# Patient Record
Sex: MALE | Race: WHITE | NOT HISPANIC OR LATINO | Employment: OTHER | URBAN - METROPOLITAN AREA
[De-identification: names, ages, dates, MRNs, and addresses within clinical notes are randomized per-mention and may not be internally consistent; named-entity substitution may affect disease eponyms.]

---

## 2019-02-18 ENCOUNTER — TELEPHONE (OUTPATIENT)
Dept: GASTROENTEROLOGY | Facility: CLINIC | Age: 74
End: 2019-02-18

## 2019-02-28 ENCOUNTER — TELEPHONE (OUTPATIENT)
Dept: GASTROENTEROLOGY | Facility: CLINIC | Age: 74
End: 2019-02-28

## 2019-03-05 ENCOUNTER — OFFICE VISIT (OUTPATIENT)
Dept: GASTROENTEROLOGY | Facility: CLINIC | Age: 74
End: 2019-03-05
Payer: COMMERCIAL

## 2019-03-05 VITALS
SYSTOLIC BLOOD PRESSURE: 130 MMHG | BODY MASS INDEX: 25.46 KG/M2 | HEART RATE: 81 BPM | WEIGHT: 168 LBS | DIASTOLIC BLOOD PRESSURE: 68 MMHG | HEIGHT: 68 IN | RESPIRATION RATE: 16 BRPM

## 2019-03-05 DIAGNOSIS — R19.4 CHANGE IN BOWEL HABITS: ICD-10-CM

## 2019-03-05 DIAGNOSIS — Z86.010 HISTORY OF COLON POLYPS: ICD-10-CM

## 2019-03-05 DIAGNOSIS — K59.09 OTHER CONSTIPATION: ICD-10-CM

## 2019-03-05 DIAGNOSIS — R11.0 NAUSEA: ICD-10-CM

## 2019-03-05 DIAGNOSIS — K21.9 GASTROESOPHAGEAL REFLUX DISEASE, ESOPHAGITIS PRESENCE NOT SPECIFIED: ICD-10-CM

## 2019-03-05 DIAGNOSIS — R63.4 WEIGHT LOSS: ICD-10-CM

## 2019-03-05 DIAGNOSIS — R10.13 EPIGASTRIC PAIN: Primary | ICD-10-CM

## 2019-03-05 PROBLEM — Z86.0100 HISTORY OF COLON POLYPS: Status: ACTIVE | Noted: 2019-03-05

## 2019-03-05 PROCEDURE — 99204 OFFICE O/P NEW MOD 45 MIN: CPT | Performed by: INTERNAL MEDICINE

## 2019-03-05 RX ORDER — ZALEPLON 10 MG/1
10 CAPSULE ORAL DAILY PRN
Refills: 5 | COMMUNITY
Start: 2019-02-24

## 2019-03-05 RX ORDER — IMATINIB MESYLATE 400 MG/1
TABLET, FILM COATED ORAL
Status: ON HOLD | COMMUNITY
Start: 2018-12-23 | End: 2019-03-26

## 2019-03-05 RX ORDER — IMATINIB MESYLATE 400 MG/1
TABLET, FILM COATED ORAL
COMMUNITY

## 2019-03-05 RX ORDER — GABAPENTIN 600 MG/1
TABLET ORAL
COMMUNITY

## 2019-03-05 RX ORDER — ALPRAZOLAM 2 MG/1
TABLET ORAL
COMMUNITY

## 2019-03-05 RX ORDER — OXYCODONE HYDROCHLORIDE 15 MG/1
TABLET ORAL
COMMUNITY

## 2019-03-05 RX ORDER — HYDROCHLOROTHIAZIDE 50 MG/1
50 TABLET ORAL DAILY
Refills: 1 | COMMUNITY
Start: 2019-01-07

## 2019-03-05 RX ORDER — OXYCODONE HCL 10 MG/1
TABLET, FILM COATED, EXTENDED RELEASE ORAL EVERY 12 HOURS
COMMUNITY

## 2019-03-05 RX ORDER — QUETIAPINE FUMARATE 100 MG/1
100 TABLET, FILM COATED ORAL
COMMUNITY

## 2019-03-05 RX ORDER — BUPROPION HYDROCHLORIDE 150 MG/1
TABLET ORAL
Refills: 0 | Status: ON HOLD | COMMUNITY
Start: 2019-02-07 | End: 2019-03-26

## 2019-03-05 RX ORDER — ALPRAZOLAM 2 MG/1
2 TABLET, EXTENDED RELEASE ORAL 3 TIMES DAILY
Refills: 5 | COMMUNITY
Start: 2019-02-13

## 2019-03-05 RX ORDER — ONDANSETRON HYDROCHLORIDE 8 MG/1
TABLET, FILM COATED ORAL
Refills: 1 | COMMUNITY
Start: 2018-12-15

## 2019-03-05 NOTE — PROGRESS NOTES
[facility]      Chief Complaint:   Abdominal pain    HPI:  Alessandro Ruiz is a self-referred 68 y o   male who presents with  Abdominal pain  He comes at the request of his oncologist who asked that he find a gastroenterologist   According to the patient he has a history of CML originally diagnosed in 2008  For some time he has been having daily abdominal pain  This is mostly midepigastric and associated with nausea  This started when he began treatment with Gleevec  He takes Zofran for the nausea  He notes that both Tums and gas ex help with the abdominal discomfort  He also drinks aloe which helps  Yogurt also helps  He has had 10 lb weight loss over the last 6-8 months  His last MRI of the abdomen according to the patient was 2 years ago  His oncologist was apparently satisfied with the results  Ten years ago the patient underwent both EGD and colonoscopy  He was found to have polyps but told he only needed 10 year follow-up  He does have a change in bowel habits with some increasing constipation  He denies any rectal bleeding or hematochezia  The patient sees oncologist about every 3 months  He has no vomiting  No dysphagia  No chest pain  He does have some nocturnal symptomatology with reflux  He actually brings substance back up into the back of his throat  There is no actual vomiting  Again food and the other above-mentioned items make this better  He does take aspirin and occasional ibuprofen for the abdominal pain  He has not had any recent studies of any kind  He has significant anxiety about these things because of his diagnosis of leukemia  He has been found to have prostate cancer but this is apparently low grade and just being observed  He has no other GI symptomatology  No other definitive exacerbating or remitting factors        Review of Systems:   Constitutional: No fever or chills, feels well, no tiredness, no recent weight gain or weight loss  HENT: No complaints of earache, no hearing loss, no nosebleeds, no nasal discharge, no sore throat, no hoarseness  Eyes: No complaints of eye pain, no red eyes, no discharge from eyes, no itchy eyes  Cardiovascular: No complaints of slow heart rate, no fast heart rate, no chest pain, no palpitations, no leg claudication, no lower extremity edema  Respiratory: No complaints of shortness of breath, no wheezing, no cough, no SOB on exertion, no orthopnea  Gastrointestinal: As noted in HPI  Genitourinary: No complaints of dysuria, no incontinence, no hesitancy, no nocturia  Musculoskeletal:   Positive arthralgias and myalgias  Neurological: No complaints of headache, no confusion, no convulsions, no numbness or tingling, no dizziness or fainting, no limb weakness, no difficulty walking  Skin: No complaints of skin rash or skin lesions, no itching, no skin wound, no dry skin  Hematological/Lymphatic: No complaints of swollen glands, does not bleed easy  Allergic/Immunologic: No immunocompromised state  Endocrine:  No complaints of polyuria, no polydipsia     Psychiatric/Behavioral: is not suicidal, no sleep disturbances,  Positive anxiety       Historical Information   Past Medical History:   Diagnosis Date    Anxiety     [facility]ML [facility]chronic [facility]ocytic leukemia) ([facility] )     Hypertension     Prostate cancer ([facility] )      Past Surgical History:   Procedure Laterality Date    TONSILLECTOMY       Social History   Social History     Substance and Sexual Activity   Alcohol Use Never    Frequency: Never     Social History     Substance and Sexual Activity   Drug Use Never     Social History     Tobacco Use   Smoking Status Current Some Day Smoker   Smokeless Tobacco Current User   Tobacco Comment    VAPE     Family History   Problem Relation Age of Onset    Pneumonia Mother     Ulcerative colitis Father          Current Medications: has a current medication list which includes the following prescription(s): alprazolam, alprazolam er, bupropion, gabapentin, hydrochlorothiazide, imatinib, imatinib, ondansetron, oxycodone, oxycodone, quetiapine, and zaleplon  Vital Signs: /68   Pulse 81   Resp 16   Ht 5' 8" (1 727 m)   Wt 76 2 kg (168 lb)   BMI 25 54 kg/m²       Physical Exam:   Constitutional  General Appearance: No acute distress, well appearing and well nourished  Head  Normocephalic  Eyes  Conjunctivae and lids: No swelling, erythema, or discharge  Pupils and irises: Equal, round and reactive to light  Ears, Nose, Mouth, and Throat  External inspection of ears and nose: Normal  Nasal mucosa, septum and turbinates: Normal without edema or erythema/   Oropharynx: Normal with no erythema, edema, exudate or lesions  Neck  Normal range of motion  Neck supple  Cardiovascular  Auscultation of the heart: Normal rate and rhythm, normal S1 and S2 without murmurs  Examination of the extremities for edema and/or varicosities: Normal  Pulmonary/Chest  Respiratory effort: No increased work of breathing or signs of respiratory distress  Auscultation of lungs: Clear to auscultation, equal breath sounds bilaterally, no wheezes, rales, no rhonchi  Abdomen  Abdomen: Non-tender, no masses  Liver and spleen: No hepatomegaly or splenomegaly  Musculoskeletal  Gait and station: normal   Digits and Nails: normal without clubbing or cyanosis  Inspection/palpation of joints, bones, and muscles: Normal  Neurological  No nystagmus or asterixis  Skin  Skin and subcutaneous tissue: Normal without rashes or lesions  Lymphatic  Palpation of the lymph nodes in neck: No lymphadenopathy     Psychiatric  Orientation to person, place and time: Normal   Mood and affect: Normal          Labs:  No results found for: ALT, AST, BUN, CALCIUM, CL, CHOL, CO2, CREATININE, GFRAA, GFRNONAA, HDL, HCT, HGB, HGBA1C, LDL, MG, PHOS, PLT, K, PSA, NA, TRIG, WBC      X-Rays & Procedures:   No orders to display ______________________________________________________________________      Assessment & Plan:     Litzy Amador was seen today for abdominal pain and constipation  Diagnoses and all orders for this visit:    Epigastric pain    Nausea    Gastroesophageal reflux disease, esophagitis presence not specified    Weight loss    Change in bowel habits    Other constipation    History of colon polyps     patient will be scheduled for both EGD and colonoscopy  Last colonoscopy and EGD were 10 years ago when polyps were found  He will also undergo ultrasound of the abdomen  He is advised to begin Ensure 2-3 cans a day  He will begin Pepcid 20 mg p  O  B i d  Further recommendations will depend on the study results

## 2019-03-17 ENCOUNTER — HOSPITAL ENCOUNTER (OUTPATIENT)
Dept: ULTRASOUND IMAGING | Facility: HOSPITAL | Age: 74
Discharge: HOME | End: 2019-03-17
Attending: INTERNAL MEDICINE
Payer: COMMERCIAL

## 2019-03-17 DIAGNOSIS — R10.13 EPIGASTRIC PAIN: ICD-10-CM

## 2019-03-17 DIAGNOSIS — R63.4 WEIGHT LOSS: ICD-10-CM

## 2019-03-17 PROCEDURE — 76700 US EXAM ABDOM COMPLETE: CPT

## 2019-03-18 ENCOUNTER — TELEPHONE (OUTPATIENT)
Dept: GASTROENTEROLOGY | Facility: CLINIC | Age: 74
End: 2019-03-18

## 2019-03-25 ENCOUNTER — ANESTHESIA EVENT (OUTPATIENT)
Dept: PERIOP | Facility: HOSPITAL | Age: 74
End: 2019-03-25
Payer: COMMERCIAL

## 2019-03-26 ENCOUNTER — ANESTHESIA (OUTPATIENT)
Dept: PERIOP | Facility: HOSPITAL | Age: 74
End: 2019-03-26
Payer: COMMERCIAL

## 2019-03-26 ENCOUNTER — HOSPITAL ENCOUNTER (OUTPATIENT)
Facility: HOSPITAL | Age: 74
Setting detail: OUTPATIENT SURGERY
Discharge: HOME | End: 2019-03-26
Attending: INTERNAL MEDICINE | Admitting: INTERNAL MEDICINE
Payer: COMMERCIAL

## 2019-03-26 VITALS
BODY MASS INDEX: 25.26 KG/M2 | HEART RATE: 72 BPM | WEIGHT: 166.67 LBS | OXYGEN SATURATION: 98 % | SYSTOLIC BLOOD PRESSURE: 117 MMHG | TEMPERATURE: 97.4 F | HEIGHT: 68 IN | RESPIRATION RATE: 19 BRPM | DIASTOLIC BLOOD PRESSURE: 64 MMHG

## 2019-03-26 DIAGNOSIS — R63.4 WEIGHT LOSS: ICD-10-CM

## 2019-03-26 DIAGNOSIS — K21.9 GASTROESOPHAGEAL REFLUX DISEASE WITHOUT ESOPHAGITIS: ICD-10-CM

## 2019-03-26 DIAGNOSIS — R10.13 MIDEPIGASTRIC PAIN: ICD-10-CM

## 2019-03-26 DIAGNOSIS — Z86.010 HISTORY OF COLON POLYPS: ICD-10-CM

## 2019-03-26 DIAGNOSIS — R19.4 CHANGE IN BOWEL HABITS: ICD-10-CM

## 2019-03-26 DIAGNOSIS — R11.0 NAUSEA: ICD-10-CM

## 2019-03-26 PROCEDURE — 88305 TISSUE EXAM BY PATHOLOGIST: CPT | Performed by: PATHOLOGY

## 2019-03-26 PROCEDURE — 43239 EGD BIOPSY SINGLE/MULTIPLE: CPT | Performed by: INTERNAL MEDICINE

## 2019-03-26 RX ORDER — SODIUM CHLORIDE, SODIUM LACTATE, POTASSIUM CHLORIDE, CALCIUM CHLORIDE 600; 310; 30; 20 MG/100ML; MG/100ML; MG/100ML; MG/100ML
125 INJECTION, SOLUTION INTRAVENOUS CONTINUOUS
Status: DISCONTINUED | OUTPATIENT
Start: 2019-03-26 | End: 2019-03-26 | Stop reason: HOSPADM

## 2019-03-26 RX ORDER — PROPOFOL 10 MG/ML
INJECTION, EMULSION INTRAVENOUS AS NEEDED
Status: DISCONTINUED | OUTPATIENT
Start: 2019-03-26 | End: 2019-03-26 | Stop reason: SURG

## 2019-03-26 RX ADMIN — PROPOFOL 100 MG: 10 INJECTION, EMULSION INTRAVENOUS at 11:42

## 2019-03-26 RX ADMIN — PROPOFOL 20 MG: 10 INJECTION, EMULSION INTRAVENOUS at 11:47

## 2019-03-26 RX ADMIN — PROPOFOL 30 MG: 10 INJECTION, EMULSION INTRAVENOUS at 11:45

## 2019-03-26 RX ADMIN — SODIUM CHLORIDE, SODIUM LACTATE, POTASSIUM CHLORIDE, AND CALCIUM CHLORIDE: .6; .31; .03; .02 INJECTION, SOLUTION INTRAVENOUS at 11:15

## 2019-03-26 NOTE — ANESTHESIA POSTPROCEDURE EVALUATION
Post-Op Assessment Note    CV Status:  Stable  Pain Score: 0    Pain management: adequate     Mental Status:  Alert and awake   Hydration Status:  Stable   PONV Controlled:  None   Airway Patency:  Adequate   Post Op Vitals Reviewed: Yes      Staff: Anesthesiologist, CRNA           BP   110/56   Temp      Pulse  64   Resp   18   SpO2   99%

## 2019-03-26 NOTE — ANESTHESIA PREPROCEDURE EVALUATION
Review of Systems/Medical History  Patient summary reviewed  Chart reviewed      Cardiovascular  EKG reviewed, Exercise tolerance (METS): >4,  Hypertension controlled,    Pulmonary  Negative pulmonary ROS        GI/Hepatic    GERD well controlled,        Negative  ROS        Endo/Other  Negative endo/other ROS      GYN       Hematology  Negative hematology ROS      Musculoskeletal  Negative musculoskeletal ROS        Neurology  Negative neurology ROS      Psychology   Anxiety,              Physical Exam    Airway    Mallampati score: II  TM Distance: <3 FB  Neck ROM: full     Dental   No notable dental hx     Cardiovascular  Rhythm: regular, Rate: normal, Cardiovascular exam normal    Pulmonary  Pulmonary exam normal Breath sounds clear to auscultation,     Other Findings        Anesthesia Plan  ASA Score- 2     Anesthesia Type- IV sedation with anesthesia with ASA Monitors  Additional Monitors:   Airway Plan:         Plan Factors-    Induction- intravenous  Postoperative Plan-     Informed Consent- Anesthetic plan and risks discussed with patient  I personally reviewed this patient with the CRNA  Discussed and agreed on the Anesthesia Plan with the CRNA  Malena Alonzo

## 2019-04-02 ENCOUNTER — TELEPHONE (OUTPATIENT)
Dept: GASTROENTEROLOGY | Facility: CLINIC | Age: 74
End: 2019-04-02

## 2019-05-06 ENCOUNTER — TELEPHONE (OUTPATIENT)
Dept: GASTROENTEROLOGY | Facility: CLINIC | Age: 74
End: 2019-05-06

## 2019-05-31 ENCOUNTER — TELEPHONE (OUTPATIENT)
Dept: GASTROENTEROLOGY | Facility: CLINIC | Age: 74
End: 2019-05-31

## 2019-07-09 ENCOUNTER — TELEPHONE (OUTPATIENT)
Dept: GASTROENTEROLOGY | Facility: CLINIC | Age: 74
End: 2019-07-09

## 2019-07-09 NOTE — TELEPHONE ENCOUNTER
ptn states he has a stomach ache everyday for at least a half a day even with taking the medication  ptn recently had a procedure with Dr Carrie Morris   Please advise

## 2019-07-09 NOTE — TELEPHONE ENCOUNTER
Patient will continue to monitor symptoms and will go to ER if symptoms increase/severe  Patient is agreeable to office visit to discuss medication regimen and symptoms  Clerical -Please contact patient to schedule follow up visit

## 2019-07-09 NOTE — TELEPHONE ENCOUNTER
Patient call for persistent abdominal pain  Patient states abdominal pain is present when he wakes up and improves by noon after taking his medications  Pain level usually 5/10, but up to 10/10 and lasts 1/2 hour to 45 mins with occasional nausea  Bowels are regular -takes stool softener as needed  Denies fever/chills  Following GERD precautions  Taking Pepcid 20 mg in AM and 40 mg in PM and Tums/Gas X as needed  Alarm symptoms discussed with patient and he will go to ER for evaluation if symptoms become severe

## 2021-04-13 DIAGNOSIS — Z23 ENCOUNTER FOR IMMUNIZATION: ICD-10-CM

## 2022-05-19 NOTE — TELEPHONE ENCOUNTER
1:30 Tuesday or thursday
Can you call pt make appt plz 
Spoke to ptn wife, they will pick Tuesday or Thursday and call us back to let us know which day to schedule 
wine

## 2024-11-06 ENCOUNTER — CONSULT (OUTPATIENT)
Age: 79
End: 2024-11-06
Payer: COMMERCIAL

## 2024-11-06 VITALS
HEART RATE: 67 BPM | WEIGHT: 172 LBS | BODY MASS INDEX: 26.07 KG/M2 | HEIGHT: 68 IN | DIASTOLIC BLOOD PRESSURE: 70 MMHG | SYSTOLIC BLOOD PRESSURE: 108 MMHG | OXYGEN SATURATION: 98 %

## 2024-11-06 DIAGNOSIS — R11.0 NAUSEA: ICD-10-CM

## 2024-11-06 DIAGNOSIS — R10.13 EPIGASTRIC PAIN: Primary | ICD-10-CM

## 2024-11-06 DIAGNOSIS — K59.00 CONSTIPATION, UNSPECIFIED CONSTIPATION TYPE: ICD-10-CM

## 2024-11-06 DIAGNOSIS — K62.5 RECTAL BLEEDING: ICD-10-CM

## 2024-11-06 PROCEDURE — 99204 OFFICE O/P NEW MOD 45 MIN: CPT | Performed by: PHYSICIAN ASSISTANT

## 2024-11-06 RX ORDER — HYDROXYZINE HYDROCHLORIDE 50 MG/1
1 TABLET, FILM COATED ORAL 2 TIMES DAILY
COMMUNITY

## 2024-11-06 RX ORDER — DULOXETIN HYDROCHLORIDE 60 MG/1
2 CAPSULE, DELAYED RELEASE ORAL DAILY
COMMUNITY
Start: 2024-10-23

## 2024-11-06 NOTE — PATIENT INSTRUCTIONS
Scheduled date of EGD/colonoscopy (as of today): 11/19/24  Physician performing EGD/colonoscopy: Caio  Location of EGD/colonoscopy: [facility]  Desired bowel prep reviewed with patient: Miralax  Instructions reviewed with patient by: Sharita VELA  Clearances:

## 2024-11-06 NOTE — H&P (VIEW-ONLY)
[facility] - Outpatient Consultation  Sreekanth Rojas 79 y.o. male MRN: [id number]  Encounter: [id number]          ASSESSMENT AND PLAN:      1. Epigastric pain  2. Nausea  3. Rectal bleeding  4. Constipation    Patient presents with complaints of worsening epigastric discomfort over the past month with associated nausea.  He also reports constipation and intermittent rectal bleeding/blood in the stool.  He has a hx of a 3cm HH on EGD in 2019.  He reports his last colonoscopy was over 15 years ago.    Will plan for EGD and colonoscopy to investigate.  Abdominal ultrasound to investigate.  Will begin a course of Pepcid 20mg po BID.  Will begin Miralax 1 capful daily to regulate  his bowel movements.    ______________________________________________________________________    HPI:  Patient is a pleasant 79 year old male with a PMH of CML, anxiety, HTN, prostate cancer who presents to the office for a gastrointestinal evaluation. Patient presents with complaints of worsening epigastric discomfort over the past month with associated nausea. He also reports constipation and intermittent rectal bleeding/blood in the stool. He has a history of a 3cm hiatal hernia on EGD in 2019.  He reports his last colonoscopy was over 15 years ago.  He thinks he has a history of colon polyps.  No family history of esophageal, stomach, colon, or pancreatic cancer.    I discussed informed consent with the patient for the EGD/colonoscopy. The risks/benefits of the procedure were discussed with the patient. Risks included, but not limited to, infection, bleeding, perforation were discussed. Patient was agreeable.       REVIEW OF SYSTEMS:    CONSTITUTIONAL: Denies any fever, chills, rigors, and weight loss.  HEENT: No earache or tinnitus. Denies hearing loss or visual disturbances.  CARDIOVASCULAR: No chest pain or palpitations.   RESPIRATORY: Denies any cough, hemoptysis, shortness of breath or dyspnea on  exertion.  GASTROINTESTINAL: As noted in the History of Present Illness.   GENITOURINARY: No problems with urination. Denies any hematuria or dysuria.  NEUROLOGIC: No dizziness or vertigo, denies headaches.   MUSCULOSKELETAL: Denies any muscle or joint pain.   SKIN: Denies skin rashes or itching.   ENDOCRINE: Denies excessive thirst. Denies intolerance to heat or cold.  PSYCHOSOCIAL: Denies depression or anxiety. Denies any recent memory loss.       Historical Information   Past Medical History:   Diagnosis Date    Anxiety     CML (chronic myelocytic leukemia) (HCC)     Hypertension     Prostate cancer (HCC)      Past Surgical History:   Procedure Laterality Date    COLONOSCOPY      ESOPHAGOGASTRODUODENOSCOPY      [place] ESOPHAGOGASTRODUODENOSCOPY TRANSORAL DIAGNOSTIC N/A 3/26/2019    Procedure: ESOPHAGOGASTRODUODENOSCOPY (EGD);  Surgeon: Simón Kearney MD;  Location: [facility];  Service: Gastroenterology    PROSTATE BIOPSY      TONSILLECTOMY       Social History   Social History     Substance and Sexual Activity   Alcohol Use Not Currently    Alcohol/week: 1.0 standard drink of alcohol    Types: 1 Glasses of wine per week     Social History     Substance and Sexual Activity   Drug Use Never     Social History     Tobacco Use   Smoking Status Never   Smokeless Tobacco Former   Tobacco Comments    VAPE     Family History   Problem Relation Age of Onset    Pneumonia Mother     Ulcerative colitis Father        Meds/Allergies       Current Outpatient Medications:     ALPRAZolam (XANAX) 2 MG tablet    ALPRAZolam ER (XANAX XR) 2 MG 24 hr tablet    DULoxetine (CYMBALTA) 60 mg delayed release capsule    hydrochlorothiazide (HYDRODIURIL) 50 mg tablet    hydrOXYzine HCL (ATARAX) 50 mg tablet    imatinib (GLEEVEC) 400 mg tablet    ondansetron (ZOFRAN) 8 mg tablet    oxyCODONE (OXYCONTIN) 10 mg 12 hr tablet    oxyCODONE (ROXICODONE) 15 mg immediate release tablet    QUEtiapine (SEROquel) 100 mg tablet    zaleplon (SONATA) 10 MG  "capsule    gabapentin (NEURONTIN) 600 MG tablet    Allergies   Allergen Reactions    Sulfa Antibiotics Swelling           Objective     Blood pressure 108/70, pulse 67, height 5' 8\" (1.727 m), weight 78 kg (172 lb), SpO2 98%. Body mass index is 26.15 kg/m².        PHYSICAL EXAM:      General Appearance:   Alert, cooperative, no distress   HEENT:   Normocephalic, atraumatic, anicteric.     Neck:  Supple, symmetrical, trachea midline   Lungs:   Clear to auscultation bilaterally; no rales, rhonchi or wheezing; respirations unlabored    Heart::   Regular rate and rhythm; no murmur, rub, or gallop.   Abdomen:   Soft, non-tender, non-distended; normal bowel sounds; no masses, no organomegaly    Genitalia:   Deferred    Rectal:   Deferred    Extremities:  No cyanosis, clubbing or edema    Pulses:  2+ and symmetric    Skin:  No jaundice, rashes, or lesions    Lymph nodes:  No palpable cervical lymphadenopathy        Lab Results:   No visits with results within 1 Day(s) from this visit.   Latest known visit with results is:   Admission on 03/26/2019, Discharged on 03/26/2019   Component Date Value    Case Report 03/26/2019                      Value:Surgical Pathology Report                         Case: [id number]                                   Authorizing Provider:  Simón Kearney MD      Collected:           03/26/2019 1144              Ordering Location:     [facility] Received:            03/26/2019 5556                                     Operating Room                                                               Pathologist:           Misa Newell MD                                                               Specimens:   A) - Stomach, antrum                                                                                B) - Stomach, body                                                                                  C) - Stomach, fundus                                                          " "             Final Diagnosis 03/26/2019                      Value:A. Antrum (biopsy):                          - Antral mucosa with no diagnostic abnormality                          - No H pylori identified (H&E)                          - No intestinal metaplasia                                                     B. Stomach, body (biopsy):                          - Mild chronic inactive oxyntic gastritis                          - No H pylori identified (H&E)                          - No intestinal metaplasia                                                     C. Stomach, fundus (biopsy):                          - Oxyntic mucosa with mild chronic inflammation                           - No H pylori identified (H&E)                          - No intestinal metaplasia     Additional Information 03/26/2019                      Value:All controls performed with the immunohistochemical stains reported above                           reacted appropriately.  These tests were developed and their performance                           characteristics determined by [facility] or                           [employer]. They may not be cleared or approved by the [place]                           Food and Drug Administration. The FDA has determined that such clearance                           or approval is not necessary. These tests are used for clinical purposes.                           They should not be regarded as investigational or for research. This                           laboratory has been approved by CLIA 88, designated as a high-complexity                           laboratory and is qualified to perform these tests.    Gross Description 03/26/2019                      Value:A. The specimen is received in formalin, labeled with the patient's name                           and medical record number, and is designated \"stomach antrum biopsy rule                           out H " "pylori”.  The specimen consists of 2 tan-pink soft tissue fragments                           measuring 0.4 and 0.6 cm in greatest dimension.  The specimen is entirely                           submitted in a screened cassette.                                                    B. The specimen is received in formalin, labeled with the patient's name                           and medical record number, and is designated \"stomach body biopsy rule out                           H pylori”.  The specimen consists of a single tan-brown soft tissue                           fragment measuring 0.8 cm in greatest dimension.  The specimen is entirely                           submitted in a screened cassette.                                                    C. The specimen is received in formalin, labeled with the patient's name                           and medical record number, and is designated \"stomach fundus biopsy rule                           out H pylori”.  The specimen consists of 3 tan-pink soft tissue fragments                           ranging from 0.2-0.6 cm in greatest dimension.  The specimen is entirely                           submitted in a screened cassette.                                                    Note: The estimated total formalin fixation time based upon information                           provided by the submitting clinician and the standard processing schedule                           is under 72 hours.                                                                             AGezyk    Clinical Information 03/26/2019                      Value:Cold bx r/o H Pylori    EGD  Esophageal mucosa is normal throughout its length with a normal Z-line at 35 cm. Beyond this there is a 3 cm hiatal hernia with normal gastric mucosa.  The stomach is entered.  The body and antrum normal.  The pylorus is normal.  The duodenum was cannulated into the 3rd portion and is normal.  Retroversion reveals " a normal GE junction fundus and cardia         Radiology Results:   No results found.

## 2024-11-06 NOTE — PROGRESS NOTES
[facility] - Outpatient Consultation  Sreekanth Rojas 79 y.o. male MRN: [id number]  Encounter: [id number]          ASSESSMENT AND PLAN:      1. Epigastric pain  2. Nausea  3. Rectal bleeding  4. Constipation    Patient presents with complaints of worsening epigastric discomfort over the past month with associated nausea.  He also reports constipation and intermittent rectal bleeding/blood in the stool.  He has a hx of a 3cm HH on EGD in 2019.  He reports his last colonoscopy was over 15 years ago.    Will plan for EGD and colonoscopy to investigate.  Abdominal ultrasound to investigate.  Will begin a course of Pepcid 20mg po BID.  Will begin Miralax 1 capful daily to regulate  his bowel movements.    ______________________________________________________________________    HPI:  Patient is a pleasant 79 year old male with a PMH of CML, anxiety, HTN, prostate cancer who presents to the office for a gastrointestinal evaluation. Patient presents with complaints of worsening epigastric discomfort over the past month with associated nausea. He also reports constipation and intermittent rectal bleeding/blood in the stool. He has a history of a 3cm hiatal hernia on EGD in 2019.  He reports his last colonoscopy was over 15 years ago.  He thinks he has a history of colon polyps.  No family history of esophageal, stomach, colon, or pancreatic cancer.    I discussed informed consent with the patient for the EGD/colonoscopy. The risks/benefits of the procedure were discussed with the patient. Risks included, but not limited to, infection, bleeding, perforation were discussed. Patient was agreeable.       REVIEW OF SYSTEMS:    CONSTITUTIONAL: Denies any fever, chills, rigors, and weight loss.  HEENT: No earache or tinnitus. Denies hearing loss or visual disturbances.  CARDIOVASCULAR: No chest pain or palpitations.   RESPIRATORY: Denies any cough, hemoptysis, shortness of breath or dyspnea on  exertion.  GASTROINTESTINAL: As noted in the History of Present Illness.   GENITOURINARY: No problems with urination. Denies any hematuria or dysuria.  NEUROLOGIC: No dizziness or vertigo, denies headaches.   MUSCULOSKELETAL: Denies any muscle or joint pain.   SKIN: Denies skin rashes or itching.   ENDOCRINE: Denies excessive thirst. Denies intolerance to heat or cold.  PSYCHOSOCIAL: Denies depression or anxiety. Denies any recent memory loss.       Historical Information   Past Medical History:   Diagnosis Date    Anxiety     CML (chronic myelocytic leukemia) (HCC)     Hypertension     Prostate cancer (HCC)      Past Surgical History:   Procedure Laterality Date    COLONOSCOPY      ESOPHAGOGASTRODUODENOSCOPY      MO ESOPHAGOGASTRODUODENOSCOPY TRANSORAL DIAGNOSTIC N/A 3/26/2019    Procedure: ESOPHAGOGASTRODUODENOSCOPY (EGD);  Surgeon: Simón Kearney MD;  Location: [facility];  Service: Gastroenterology    PROSTATE BIOPSY      TONSILLECTOMY       Social History   Social History     Substance and Sexual Activity   Alcohol Use Not Currently    Alcohol/week: 1.0 standard drink of alcohol    Types: 1 Glasses of wine per week     Social History     Substance and Sexual Activity   Drug Use Never     Social History     Tobacco Use   Smoking Status Never   Smokeless Tobacco Former   Tobacco Comments    VAPE     Family History   Problem Relation Age of Onset    Pneumonia Mother     Ulcerative colitis Father        Meds/Allergies       Current Outpatient Medications:     ALPRAZolam (XANAX) 2 MG tablet    ALPRAZolam ER (XANAX XR) 2 MG 24 hr tablet    DULoxetine (CYMBALTA) 60 mg delayed release capsule    hydrochlorothiazide (HYDRODIURIL) 50 mg tablet    hydrOXYzine HCL (ATARAX) 50 mg tablet    imatinib (GLEEVEC) 400 mg tablet    ondansetron (ZOFRAN) 8 mg tablet    oxyCODONE (OXYCONTIN) 10 mg 12 hr tablet    oxyCODONE (ROXICODONE) 15 mg immediate release tablet    QUEtiapine (SEROquel) 100 mg tablet    zaleplon (SONATA) 10 MG  "capsule    gabapentin (NEURONTIN) 600 MG tablet    Allergies   Allergen Reactions    Sulfa Antibiotics Swelling           Objective     Blood pressure 108/70, pulse 67, height 5' 8\" (1.727 m), weight 78 kg (172 lb), SpO2 98%. Body mass index is 26.15 kg/m².        PHYSICAL EXAM:      General Appearance:   Alert, cooperative, no distress   HEENT:   Normocephalic, atraumatic, anicteric.     Neck:  Supple, symmetrical, trachea midline   Lungs:   Clear to auscultation bilaterally; no rales, rhonchi or wheezing; respirations unlabored    Heart::   Regular rate and rhythm; no murmur, rub, or gallop.   Abdomen:   Soft, non-tender, non-distended; normal bowel sounds; no masses, no organomegaly    Genitalia:   Deferred    Rectal:   Deferred    Extremities:  No cyanosis, clubbing or edema    Pulses:  2+ and symmetric    Skin:  No jaundice, rashes, or lesions    Lymph nodes:  No palpable cervical lymphadenopathy        Lab Results:   No visits with results within 1 Day(s) from this visit.   Latest known visit with results is:   Admission on 03/26/2019, Discharged on 03/26/2019   Component Date Value    Case Report 03/26/2019                      Value:Surgical Pathology Report                         Case: [id number]                                   Authorizing Provider:  Simón Kearney MD      Collected:           03/26/2019 1144              Ordering Location:     [facility] Received:            03/26/2019 4876                                     Operating Room                                                               Pathologist:           Misa Newell MD                                                               Specimens:   A) - Stomach, antrum                                                                                B) - Stomach, body                                                                                  C) - Stomach, fundus                                                          " "             Final Diagnosis 03/26/2019                      Value:A. Antrum (biopsy):                          - Antral mucosa with no diagnostic abnormality                          - No H pylori identified (H&E)                          - No intestinal metaplasia                                                     B. Stomach, body (biopsy):                          - Mild chronic inactive oxyntic gastritis                          - No H pylori identified (H&E)                          - No intestinal metaplasia                                                     C. Stomach, fundus (biopsy):                          - Oxyntic mucosa with mild chronic inflammation                           - No H pylori identified (H&E)                          - No intestinal metaplasia     Additional Information 03/26/2019                      Value:All controls performed with the immunohistochemical stains reported above                           reacted appropriately.  These tests were developed and their performance                           characteristics determined by [facility] or                           [place]. They may not be cleared or approved by the [place]                           Food and Drug Administration. The FDA has determined that such clearance                           or approval is not necessary. These tests are used for clinical purposes.                           They should not be regarded as investigational or for research. This                           laboratory has been approved by CLIA 88, designated as a high-complexity                           laboratory and is qualified to perform these tests.    Gross Description 03/26/2019                      Value:A. The specimen is received in formalin, labeled with the patient's name                           and medical record number, and is designated \"stomach antrum biopsy rule                           out H " "pylori”.  The specimen consists of 2 tan-pink soft tissue fragments                           measuring 0.4 and 0.6 cm in greatest dimension.  The specimen is entirely                           submitted in a screened cassette.                                                    B. The specimen is received in formalin, labeled with the patient's name                           and medical record number, and is designated \"stomach body biopsy rule out                           H pylori”.  The specimen consists of a single tan-brown soft tissue                           fragment measuring 0.8 cm in greatest dimension.  The specimen is entirely                           submitted in a screened cassette.                                                    C. The specimen is received in formalin, labeled with the patient's name                           and medical record number, and is designated \"stomach fundus biopsy rule                           out H pylori”.  The specimen consists of 3 tan-pink soft tissue fragments                           ranging from 0.2-0.6 cm in greatest dimension.  The specimen is entirely                           submitted in a screened cassette.                                                    Note: The estimated total formalin fixation time based upon information                           provided by the submitting clinician and the standard processing schedule                           is under 72 hours.                                                                             AGezyk    Clinical Information 03/26/2019                      Value:Cold bx r/o H Pylori    EGD  Esophageal mucosa is normal throughout its length with a normal Z-line at 35 cm. Beyond this there is a 3 cm hiatal hernia with normal gastric mucosa.  The stomach is entered.  The body and antrum normal.  The pylorus is normal.  The duodenum was cannulated into the 3rd portion and is normal.  Retroversion reveals " a normal GE junction fundus and cardia         Radiology Results:   No results found.

## 2024-11-11 ENCOUNTER — TELEPHONE (OUTPATIENT)
Age: 79
End: 2024-11-11

## 2024-11-11 DIAGNOSIS — Z12.11 SCREENING FOR COLON CANCER: ICD-10-CM

## 2024-11-11 DIAGNOSIS — Z86.0100 HISTORY OF COLON POLYPS: Primary | ICD-10-CM

## 2024-11-11 NOTE — TELEPHONE ENCOUNTER
Patients GI provider:  INES Sierra    Number to return call: [phone]    Reason for call: Patient and wife Kriss called in requesting to cancel colonoscopy and proceed with EGD only. Colonoscopy has been cancelled. Patient is requesting an order place for cologuard. Please reach out to patient regarding an update on the order, thank you.    Scheduled procedure/appointment date if applicable: Apt/procedure 11/19/2024

## 2024-11-11 NOTE — TELEPHONE ENCOUNTER
Patients GI provider:  Magaly BORJA    Number to return call: [phone]    Reason for call: Pt calling to cancel colonoscopy. Pt states he only wants endoscopy done on 11/19 and would like cologuard ordered instead of colonoscopy. Please advise if OK to order cologuard and cx colonoscopy.     Scheduled procedure/appointment date if applicable: procedure 11/19

## 2024-11-11 NOTE — TELEPHONE ENCOUNTER
Spoke w/ the pt, he stated he believes it's his hemorrhoids so he rather do the cologuard before a colonoscopy

## 2024-11-15 ENCOUNTER — HOSPITAL ENCOUNTER (OUTPATIENT)
Dept: ULTRASOUND IMAGING | Facility: HOSPITAL | Age: 79
End: 2024-11-15
Payer: COMMERCIAL

## 2024-11-15 DIAGNOSIS — R10.13 EPIGASTRIC PAIN: ICD-10-CM

## 2024-11-15 PROCEDURE — 76700 US EXAM ABDOM COMPLETE: CPT

## 2024-11-17 ENCOUNTER — RESULTS FOLLOW-UP (OUTPATIENT)
Dept: OTHER | Facility: HOSPITAL | Age: 79
End: 2024-11-17

## 2024-11-18 ENCOUNTER — TELEPHONE (OUTPATIENT)
Age: 79
End: 2024-11-18

## 2024-11-18 NOTE — TELEPHONE ENCOUNTER
Wife on the consent form called to check on instructions advised he can eat light today but nothing to eat or drink after midnight today advised no blood thinners meds any other med can have with sip of water only.

## 2024-11-19 ENCOUNTER — ANESTHESIA EVENT (OUTPATIENT)
Dept: GASTROENTEROLOGY | Facility: HOSPITAL | Age: 79
End: 2024-11-19
Payer: COMMERCIAL

## 2024-11-19 ENCOUNTER — HOSPITAL ENCOUNTER (OUTPATIENT)
Dept: GASTROENTEROLOGY | Facility: HOSPITAL | Age: 79
Setting detail: OUTPATIENT SURGERY
Discharge: HOME | End: 2024-11-19
Payer: COMMERCIAL

## 2024-11-19 ENCOUNTER — ANESTHESIA (OUTPATIENT)
Dept: GASTROENTEROLOGY | Facility: HOSPITAL | Age: 79
End: 2024-11-19
Payer: COMMERCIAL

## 2024-11-19 VITALS
RESPIRATION RATE: 16 BRPM | TEMPERATURE: 97.8 F | WEIGHT: 169.09 LBS | DIASTOLIC BLOOD PRESSURE: 77 MMHG | HEART RATE: 63 BPM | SYSTOLIC BLOOD PRESSURE: 167 MMHG | BODY MASS INDEX: 25.63 KG/M2 | HEIGHT: 68 IN | OXYGEN SATURATION: 97 %

## 2024-11-19 DIAGNOSIS — K29.00 ACUTE SUPERFICIAL GASTRITIS WITHOUT HEMORRHAGE: Primary | ICD-10-CM

## 2024-11-19 DIAGNOSIS — R10.13 EPIGASTRIC PAIN: ICD-10-CM

## 2024-11-19 PROCEDURE — 88305 TISSUE EXAM BY PATHOLOGIST: CPT | Performed by: STUDENT IN AN ORGANIZED HEALTH CARE EDUCATION/TRAINING PROGRAM

## 2024-11-19 PROCEDURE — 43239 EGD BIOPSY SINGLE/MULTIPLE: CPT | Performed by: INTERNAL MEDICINE

## 2024-11-19 RX ORDER — PANTOPRAZOLE SODIUM 40 MG/1
40 TABLET, DELAYED RELEASE ORAL
Qty: 60 TABLET | Refills: 2 | Status: SHIPPED | OUTPATIENT
Start: 2024-11-19

## 2024-11-19 RX ORDER — SODIUM CHLORIDE, SODIUM LACTATE, POTASSIUM CHLORIDE, CALCIUM CHLORIDE 600; 310; 30; 20 MG/100ML; MG/100ML; MG/100ML; MG/100ML
INJECTION, SOLUTION INTRAVENOUS CONTINUOUS PRN
Status: DISCONTINUED | OUTPATIENT
Start: 2024-11-19 | End: 2024-11-19

## 2024-11-19 RX ORDER — LIDOCAINE HYDROCHLORIDE 20 MG/ML
INJECTION, SOLUTION EPIDURAL; INFILTRATION; INTRACAUDAL; PERINEURAL AS NEEDED
Status: DISCONTINUED | OUTPATIENT
Start: 2024-11-19 | End: 2024-11-19

## 2024-11-19 RX ORDER — SODIUM CHLORIDE, SODIUM LACTATE, POTASSIUM CHLORIDE, CALCIUM CHLORIDE 600; 310; 30; 20 MG/100ML; MG/100ML; MG/100ML; MG/100ML
50 INJECTION, SOLUTION INTRAVENOUS CONTINUOUS
Status: DISCONTINUED | OUTPATIENT
Start: 2024-11-19 | End: 2024-11-23 | Stop reason: HOSPADM

## 2024-11-19 RX ORDER — PROPOFOL 10 MG/ML
INJECTION, EMULSION INTRAVENOUS AS NEEDED
Status: DISCONTINUED | OUTPATIENT
Start: 2024-11-19 | End: 2024-11-19

## 2024-11-19 RX ORDER — POLYETHYLENE GLYCOL 3350 17 G/17G
17 POWDER, FOR SOLUTION ORAL DAILY PRN
COMMUNITY

## 2024-11-19 RX ADMIN — PROPOFOL 70 MG: 10 INJECTION, EMULSION INTRAVENOUS at 10:19

## 2024-11-19 RX ADMIN — PROPOFOL 20 MG: 10 INJECTION, EMULSION INTRAVENOUS at 10:23

## 2024-11-19 RX ADMIN — LIDOCAINE HYDROCHLORIDE 100 MG: 20 INJECTION, SOLUTION EPIDURAL; INFILTRATION; INTRACAUDAL at 10:19

## 2024-11-19 RX ADMIN — PROPOFOL 20 MG: 10 INJECTION, EMULSION INTRAVENOUS at 10:21

## 2024-11-19 RX ADMIN — SODIUM CHLORIDE, SODIUM LACTATE, POTASSIUM CHLORIDE, AND CALCIUM CHLORIDE 50 ML/HR: .6; .31; .03; .02 INJECTION, SOLUTION INTRAVENOUS at 09:42

## 2024-11-19 RX ADMIN — SODIUM CHLORIDE, SODIUM LACTATE, POTASSIUM CHLORIDE, AND CALCIUM CHLORIDE: .6; .31; .03; .02 INJECTION, SOLUTION INTRAVENOUS at 10:15

## 2024-11-19 NOTE — INTERVAL H&P NOTE
H&P reviewed. After examining the patient I find no changes in the patients condition since the H&P had been written.    Vitals:    11/19/24 0931   BP: 156/72   Pulse: 66   Resp: 16   Temp: 98.1 °F (36.7 °C)   SpO2: 97%

## 2024-11-19 NOTE — ANESTHESIA PREPROCEDURE EVALUATION
Medical History    History Comments   Hypertension    Anxiety    Prostate cancer (HCC)    CML (chronic myelocytic leukemia) (HCC)    Procedure:  EGD    Relevant Problems   ANESTHESIA (within normal limits)      GI/HEPATIC   (+) Gastroesophageal reflux disease without esophagitis        Physical Exam    Airway    Mallampati score: II  TM Distance: >3 FB  Neck ROM: full     Dental       Cardiovascular  Rate: normal    Pulmonary  Pulmonary exam normal     Other Findings  Per pt denies anything remaining that is loose or removeable      Anesthesia Plan  ASA Score- 3     Anesthesia Type- IV sedation with anesthesia with ASA Monitors.         Additional Monitors:     Airway Plan:            Plan Factors-Exercise tolerance (METS): >4 METS.    Chart reviewed.    Patient summary reviewed.    Patient is a current smoker.              Induction- intravenous.    Postoperative Plan-         Informed Consent- Anesthetic plan and risks discussed with patient.  I personally reviewed this patient with the CRNA. Discussed and agreed on the Anesthesia Plan with the CRNA..

## 2024-11-19 NOTE — ANESTHESIA POSTPROCEDURE EVALUATION
Post-Op Assessment Note    CV Status:  Stable  Pain Score: 0    Pain management: adequate       Mental Status:  Alert, awake and sleepy   Hydration Status:  Euvolemic   PONV Controlled:  Controlled   Airway Patency:  Patent  Two or more mitigation strategies used for obstructive sleep apnea   Post Op Vitals Reviewed: Yes    No anethesia notable event occurred.    Staff: CRNA           Last Filed PACU Vitals:  Vitals Value Taken Time   Temp     Pulse 62    /74    Resp 16    SpO2 100        Modified Maria R:  No data recorded

## 2024-11-19 NOTE — INTERVAL H&P NOTE
H&P reviewed. After examining the patient I find no changes in the patients condition since the H&P had been written.  Note that patient refuses colonoscopy   There were no vitals filed for this visit.

## 2024-11-25 PROCEDURE — 88305 TISSUE EXAM BY PATHOLOGIST: CPT | Performed by: STUDENT IN AN ORGANIZED HEALTH CARE EDUCATION/TRAINING PROGRAM

## 2024-12-05 ENCOUNTER — TELEPHONE (OUTPATIENT)
Age: 79
End: 2024-12-05

## 2024-12-05 NOTE — TELEPHONE ENCOUNTER
Pt. Wife, confirmed on communication consent, calling for biopsy results, advised a message would be sent to Dr. Kearney to have him review results and advise, we would call pt. Back at [phone] with results, pt. Wife agreeable

## 2025-01-10 DIAGNOSIS — K29.00 ACUTE SUPERFICIAL GASTRITIS WITHOUT HEMORRHAGE: ICD-10-CM

## 2025-01-10 RX ORDER — PANTOPRAZOLE SODIUM 40 MG/1
40 TABLET, DELAYED RELEASE ORAL
Qty: 90 TABLET | Refills: 1 | Status: SHIPPED | OUTPATIENT
Start: 2025-01-10

## 2025-07-09 DIAGNOSIS — K29.00 ACUTE SUPERFICIAL GASTRITIS WITHOUT HEMORRHAGE: ICD-10-CM

## 2025-07-09 RX ORDER — PANTOPRAZOLE SODIUM 40 MG/1
40 TABLET, DELAYED RELEASE ORAL
Qty: 90 TABLET | Refills: 1 | Status: SHIPPED | OUTPATIENT
Start: 2025-07-09